# Patient Record
Sex: MALE | Race: WHITE | NOT HISPANIC OR LATINO | Employment: UNEMPLOYED | RURAL
[De-identification: names, ages, dates, MRNs, and addresses within clinical notes are randomized per-mention and may not be internally consistent; named-entity substitution may affect disease eponyms.]

---

## 2019-10-28 ENCOUNTER — OFFICE VISIT (OUTPATIENT)
Dept: NEUROSURGERY | Facility: CLINIC | Age: 34
End: 2019-10-28

## 2019-10-28 DIAGNOSIS — M41.20 SCOLIOSIS (AND KYPHOSCOLIOSIS), IDIOPATHIC: ICD-10-CM

## 2019-10-28 DIAGNOSIS — M48.061 SPINAL STENOSIS OF LUMBAR REGION, UNSPECIFIED WHETHER NEUROGENIC CLAUDICATION PRESENT: ICD-10-CM

## 2019-10-28 DIAGNOSIS — M51.36 DDD (DEGENERATIVE DISC DISEASE), LUMBAR: ICD-10-CM

## 2019-10-28 DIAGNOSIS — M43.8X9 SAGITTAL PLANE IMBALANCE: Primary | ICD-10-CM

## 2019-10-28 PROCEDURE — 99203 OFFICE O/P NEW LOW 30 MIN: CPT | Performed by: NEUROLOGICAL SURGERY

## 2019-10-28 NOTE — PROGRESS NOTES
Subjective   Patient ID: Roc Finnegan is a 34 y.o. male is being seen for consultation today at the request of YO Souza  Chief Complaint: Back pain    History of Present Illness: The patient is a 34-year-old man from [place] with a history of pain in his lower back and numbness in his right foot, and difficulty with standing and walking with a forward bend or tilt at the lower back that has been this way since his last surgery.  He had a lumbar laminectomy [facility] in 2013, and a lumbar laminectomy and discectomy in [place] in 2016 at .  Has been disabled for work.  He is relieved of symptoms by sitting with his feet up.  He uses a cane to help support himself as he leans forward whenever he tries to stand or walk.    Review of Radiographic Studies:  Lumbar MRI scan dated 8/21/2019 shows lumbar kyphosis from L3-S1, significant degenerative disc changes at all 3 levels, prior laminectomy L3 and L4, residual stenosis lower L4-5, prior discectomy right L5-S1.  The sagittal view shows evidence of sagittal imbalance from loss of lordosis.     The following portions of the patient's history were reviewed, updated as appropriate and approved: allergies, current medications, past family history, past medical history, past social history, past surgical history, review of systems and problem list.    Review of Systems   Constitutional: Negative for activity change, appetite change, chills, diaphoresis, fatigue, fever and unexpected weight change.   HENT: Negative for congestion, dental problem, drooling, ear discharge, ear pain, facial swelling, hearing loss, mouth sores, nosebleeds, postnasal drip, rhinorrhea, sinus pressure, sneezing, sore throat, tinnitus, trouble swallowing and voice change.    Eyes: Negative for photophobia, pain, discharge, redness, itching and visual disturbance.   Respiratory: Negative for apnea, cough, choking, chest tightness, shortness of breath, wheezing and stridor.     Cardiovascular: Negative for chest pain, palpitations and leg swelling.   Gastrointestinal: Negative for abdominal distention, abdominal pain, anal bleeding, blood in stool, constipation, diarrhea, nausea, rectal pain and vomiting.   Endocrine: Negative for cold intolerance, heat intolerance, polydipsia, polyphagia and polyuria.   Genitourinary: Negative for decreased urine volume, difficulty urinating, dysuria, enuresis, flank pain, frequency, genital sores, hematuria and urgency.   Musculoskeletal: Positive for back pain, gait problem and myalgias. Negative for arthralgias, joint swelling, neck pain and neck stiffness.   Skin: Negative for color change, pallor, rash and wound.   Allergic/Immunologic: Negative for environmental allergies, food allergies and immunocompromised state.   Neurological: Negative for dizziness, tremors, seizures, syncope, facial asymmetry, speech difficulty, weakness, light-headedness, numbness and headaches.   Hematological: Negative for adenopathy. Does not bruise/bleed easily.   Psychiatric/Behavioral: Negative for agitation, behavioral problems, confusion, decreased concentration, dysphoric mood, hallucinations, self-injury, sleep disturbance and suicidal ideas. The patient is not nervous/anxious and is not hyperactive.        Objective     NEUROLOGICAL EXAMINATION:      MENTAL STATUS:  Alert and oriented.  Speech intact.  Recent and remote memory intact.      CRANIAL NERVES:  Cranial nerve II:  Visual fields are full.  Cranial nerves III, IV and VI:  PERRLADC.  Extraocular movements are intact.  Nystagmus is not present.  Cranial nerve V:  Facial sensation is intact.  Cranial nerve VII:  Muscles of facial expression reveal no asymmetry.  Cranial nerve VIII:  Hearing is intact.  Cranial nerves IX and X:  Palate elevates symmetrically.  Cranial nerve XI:  Shoulder shrug is intact.  Cranial nerve XII:  Tongue is midline without evidence of atrophy or fasciculation.    MUSCULOSKELETAL:   SLR negative bilaterally.  Forward flexion at the waist on standing which he cannot compensate for by extension.    MOTOR: Dorsiflexion and plantarflexion intact bilaterally    SENSATION: Diminished lateral right foot    REFLEXES:  DTR trace or absent both knees and ankles      Assessment   Lumbar kyphosis with sagittal imbalance, residual stenosis L4-5.       Plan   It appears that he would need to have substantial restoration of lordosis in the lumbar spine from L3-S1.  I am not sure that simple interbody fusion with implanted cages could restore the lordosis necessary to correct his sagittal imbalance.  Could require pedicle subtraction osteotomy.  I have advised him to be seen at the [facility] in [place] for assessment, suggesting Dr. Menezes or Dr. Rivers.       Shmuel Green MD